# Patient Record
Sex: MALE | Race: WHITE
[De-identification: names, ages, dates, MRNs, and addresses within clinical notes are randomized per-mention and may not be internally consistent; named-entity substitution may affect disease eponyms.]

---

## 2023-05-10 ENCOUNTER — HOSPITAL ENCOUNTER (EMERGENCY)
Dept: HOSPITAL 97 - ER | Age: 28
LOS: 1 days | Discharge: HOME | End: 2023-05-11
Payer: COMMERCIAL

## 2023-05-10 DIAGNOSIS — S61.217A: Primary | ICD-10-CM

## 2023-05-10 PROCEDURE — 12001 RPR S/N/AX/GEN/TRNK 2.5CM/<: CPT

## 2023-05-10 PROCEDURE — 73140 X-RAY EXAM OF FINGER(S): CPT

## 2023-05-10 PROCEDURE — 99283 EMERGENCY DEPT VISIT LOW MDM: CPT

## 2023-05-10 NOTE — XMS REPORT
Continuity of Care Document

                             Created on:May 10, 2023



Patient:JOANNA AVERY

Sex:Male

:1995

External Reference #:[id number]





Demographics







                          Address                   [address] [place] [address]

 

                          Home Phone                [phone]

 

                          Work Phone                [phone]

 

                          Email Address             [email or web address]

[email or web address]

 

                          Preferred Language        English

 

                          Marital Status            Unknown

 

                          [employer] Affiliation     Unknown

 

                          Race                      Unknown

 

                          Ethnic Group              Unknown









Author







                          Organization              [facility]

t

 

                          Address                   [address] [place] [address]



                                                    [place] [address]

 

                          Phone                     [phone]









Support







                Name            Relationship    Address         Phone

 

                FIORDALIZA AVERY Unavailable     [address] [facility] [address] [facility] 

[phone]



                                                [place] [address] 

 

                FIORDALIZA AVERY Unavailable     [address] [facility] [address] [facility] 

[phone]



                                                [place] [address] 









Care Team Providers







                    Name                Role                Phone

 

                    Juan Elam  Attending Clinician Unavailable

 

                    Juan Elam A  Admitting Clinician Unavailable









Payers







           Payer Name Policy Type Policy Number Effective Date Expiration Date S

ource







Problems

This patient has no known problems.



Allergies, Adverse Reactions, Alerts

This patient has no known allergies or adverse reactions.



Medications

This patient has no known medications.



Procedures

This patient has no known procedures.



Encounters







        Start   End     Encounter Admission Attending Care    Care    Encounter 

Source



        Date/Time Date/Time Type    Type    Clinicians Facility Department ID   

   

 

        2021 Outpatient RICH Rosen   RADI    

[id number] [facility]



        09:11:00 09:11:00                 Juan                  06      [facility]







Results







           Test Description Test Time  Test Comments Results    Result     [facility]

e



                                                       Comments   

 

           - US SCROTUM AND 2021            *********************         

   



           CNTS       11:02:00              *********************            



                                            [facility]            



                                            : 1995 Sex:            



                                            M********************            



                                            *********************            



                                            *******************            



                                            Name:                 



                                            JOANNA AVERY :            



                                            1995 Age/S: 26            



                                            / M [id number]            



                                            [address] Unit #:            



                                            [id number] Loc:            



                                            [place] [address]            



                                            Phys: Juan Elam MD Acct:            



                                            [id number] Dis            



                                            Date: Status: REG CLI            



                                            PHONE #: [phone]            



                                            Exam Date: 2021            



                                            1000 FAX #: Reason:            



                                            N50.89 EXAMS:  CPT:            



                                            [id number] US SCROTUM            



                                            AND CNTS 84108            



                                            EXAMINATION: Scrotal            



                                            ultrasound            



                                            INDICATION: N50.89            



                                            COMPARISON: None            



                                            LOCATION: S17            



                                            TECHNIQUE: Grayscale            



                                            and color Doppler and            



                                            spectral Doppler            



                                            sonographic images            



                                            were obtained of the            



                                            scrotum. FINDINGS:            



                                            Scrotal skin is            



                                            normal in thickness.            



                                            Right testicle            



                                            measures 4.6 x 2.8 x            



                                            2.1 cm. Left testicle            



                                            measures 4.8 x 2.9 x            



                                            2.0 cm. Bilateral            



                                            testicles are            



                                            homogeneous in            



                                            echotexture without            



                                            evidence of focal            



                                            lesion. Relatively            



                                            symmetrical blood            



                                            flow is documented            



                                            within the bilateral            



                                            testicles. Right            



                                            epididymal head            



                                            measures 2.0 x 1.5 x            



                                            1.2 cm. Epididymal            



                                            head cyst measures            



                                            0.7 cm, which            



                                            corresponds to            



                                            palpable area of            



                                            concern indicated by            



                                            patient. Left            



                                            epididymal head            



                                            measures 1.6 x 1.1 x            



                                            1.0 cm. No focal            



                                            epididymal lesion            



                                            seen. Small bilateral            



                                            varicoceles. Small            



                                            bilateral hydroceles.            



                                            IMPRESSION:  1. Right            



                                            epididymal head cyst            



                                            corresponds to area            



                                            of palpable concern            



                                            indicated by patient.            



                                            2. Small bilateral            



                                            hydroceles. 3. Small            



                                            bilateral             



                                            varicoceles. PAGE 1            



                                            Signed Report            



                                            (CONTINUED) Name:            



                                            JOANNA AVERY :            



                                            1995 Age/S: 26            



                                            / M [id number]            



                                            [address] Unit #:            



                                            [id number] Loc:            



                                            [place] [address]            



                                            Phys: Juan Elam MD  Acct:            



                                            [id number] Dis            



                                            Date: Status: REG CLI            



                                            PHONE #: [phone]            



                                            Exam Date: 2021            



                                            1005 FAX #: Reason:            



                                            N50.89 EXAMS: CPT:            



                                            [id number] US SCROTUM            



                                            AND CNTS 58512            



                                            (Continued) **            



                                            Electronically Signed            



                                            by PATIENCE Hampton            



                                            ** ** on 2021            



                                            at 1102 ** Reported            



                                            and signed by: Yuniel Hampton M.D. CC:            



                                            Juan Elam MD            



                                            Technologist:            



                                            Chelo Beasley            



                                            Trnscb Date/Time:            



                                            2021 (1102)            



                                            ChristopherPE1 PAGE 2            



                                            Signed Report Name:            



                                            JOANNA AVERY [facility] :            



                                            1995 Age/S: 26            



                                            / M [address]            



                                            [address]  Unit #:            



                                            [id number] Loc:            



                                            [place] [address]            



                                            Phys: Juan Elam MD Acct:            



                                            [id number] Dis            



                                            Date: Status: REG CLI            



                                            PHONE #: [phone]            



                                            Exam Date: 2021            



                                            1005 FAX #: Reason:            



                                            N50.89  EXAMS: CPT:            



                                            [id number] US SCROTUM            



                                            AND CNTS 61424            



                                            (Continued) Orig            



                                            Print D/T: S:            



                                            2021 (1105)            



                                            Probe: PAGE 3 Signed            



                                            Report

## 2023-05-11 VITALS — OXYGEN SATURATION: 100 % | TEMPERATURE: 98.2 F

## 2023-05-11 VITALS — SYSTOLIC BLOOD PRESSURE: 116 MMHG | DIASTOLIC BLOOD PRESSURE: 79 MMHG

## 2023-05-11 PROCEDURE — 0HQGXZZ REPAIR LEFT HAND SKIN, EXTERNAL APPROACH: ICD-10-PCS

## 2023-05-11 NOTE — RAD REPORT
EXAM DESCRIPTION:   - Finger-Thumb Left - 5/10/2023 10:57 pm

 

 

CLINICAL HISTORY:  Laceration

 

TECHNIQUE:  2 views obtained of the left fifth digit

 

COMPARISON:  None available for comparison

 

FINDINGS:  Bones: No acute fracture or dislocation.

Joints: Joint spaces are unremarkable.

Soft tissues: No radiopaque foreign bodies. Soft tissue injury at the tip of the left fifth digit.

 

IMPRESSION:  1.   No acute fracture or dislocation.

2.   Soft tissue injury at the tip of the left fifth digit.

 

Electronically signed by:   Darin Casas MD   5/10/2023 11:15 PM CDT Workstation: [id number]

 

 

 

Due to temporary technical issues with the PACS/Fluency reporting system, reports are being signed by
 the in house radiologists without review as a courtesy to insure prompt reporting. The interpreting 
radiologist is fully responsible for the content of the report.

## 2023-05-11 NOTE — EDPHYS
Physician Documentation                                                                           

 [facility]                                                                 

Name: Julius Koehler                                                                            

Age: 27 yrs                                                                                       

Sex: Male                                                                                         

: 1995                                                                                   

MRN: [id number]                                                                                   

Arrival Date: 05/10/2023                                                                          

Time: 22:10                                                                                       

Account#: [id number]                                                                            

Bed 12                                                                                            

Private MD:                                                                                       

ED Physician Guicho Hammond                                                                      

HPI:                                                                                              

05/10                                                                                             

23:00 This 27 yrs old Male presents to ER via Ambulatory with complaints of Finger Injury.    cp  

23:00 The patient or guardian reports a laceration, clean. The complaints affect the distal   cp  

      phalanx left small finger.                                                                  

23:00 Context: The problem was sustained at home, resulted from cutting up sausage to cook.   cp  

      Onset: The symptoms/episode began/occurred just prior to arrival. Associated signs and      

      symptoms: The patient has no apparent associated signs or symptoms.                         

                                                                                                  

Historical:                                                                                       

- Allergies:                                                                                      

22:16 No Known Allergies;                                                                     mb9 

- Home Meds:                                                                                      

22:16 None [Active];                                                                          mb9 

- PMHx:                                                                                           

22:16 MRSA;                                                                                   mb9 

- PSHx:                                                                                           

22:18 Tonsillectomy;                                                                          mb9 

                                                                                                  

- Immunization history:: Adult Immunizations up to date.                                          

- Social history:: Smoking status: Patient denies any tobacco usage or history of.                

                                                                                                  

                                                                                                  

ROS:                                                                                              

23:05 Constitutional: Negative for body aches, chills, fever, poor PO intake.                 cp  

23:05 ENT: Negative for drainage from ear(s), ear pain, sore throat, difficulty swallowing,   cp  

      difficulty handling secretions.                                                             

23:05 Cardiovascular: Negative for chest pain.                                                    

23:05 Respiratory: Negative for cough, shortness of breath, wheezing.                             

23:05 Abdomen/GI: Negative for abdominal pain, nausea, vomiting, and diarrhea.                    

23:05 Skin: Positive for laceration(s), of the distal phalanx left small finger.                  

23:05 Neuro: Positive for numbness, of the distal phalanx left small finger, Negative for         

      altered mental status, headache.                                                            

23:05 All other systems are negative.                                                             

                                                                                                  

Exam:                                                                                             

23:10 Constitutional: The patient appears in no acute distress, alert, awake, comfortable,    cp  

      well developed, well nourished.                                                             

23:10 Head/Face:  Normocephalic, atraumatic.                                                  cp  

23:10 Chest/axilla: Inspection: normal.                                                           

23:10 Cardiovascular: Rate: normal.                                                               

23:10 Respiratory: the patient does not display signs of respiratory distress,  Respirations:     

      normal.                                                                                     

23:10 Abdomen/GI: Inspection: abdomen appears normal.                                             

23:10 Back: pain, is absent, ROM is normal.                                                       

23:10 Musculoskeletal/extremity: Extremities: grossly normal except: noted in the distal          

      phalanx left small finger: laceration, pain, tenderness, There is no evidence of            

      injury to nail, ROM: full active range of motion, in the left small finger, Perfusion:      

      the extremity is normally perfused throughout, the  distal phalanx left small finger        

      decreased sensation.                                                                        

                                                                                                  

Vital Signs:                                                                                      

22:14 Pulse 84; Resp 18; Temp 98.2; Pulse Ox 100% on R/A; Weight 77.11 kg; Height 5 ft. 5 in. mb9 

      ;                                                                                           

22:24  / 79;                                                                            mb9 

22:14 Body Mass Index 28.29 (77.11 kg, 165.1 cm)                                              mb9 

                                                                                                  

Laceration:                                                                                       

                                                                                             

00:30 Wound Repair of 2.5cm ( 1.0in ) subcutaneous laceration to disttal phalanx left small   cp  

      finger fish side. Linear shaped.. Distal neuro/vascular/tendon intact. Anesthesia:        

      Digital block administered with 5 mls of Lido/Marcaine. Wound prep: Moderate cleansing      

      by me, Wound irrigation by me. Skin closed with 4 5-0 Prolene using interrupted sutures     

      and sterile technique. Dressed with Bacitracin, non-adherent dressing. Patient              

      tolerated well.                                                                             

                                                                                                  

MDM:                                                                                              

05/10                                                                                             

22:22 Patient medically screened.                                                             cp  

22:45 Differential diagnosis: open fracture, simple laceration, nail injury.                    

                                                                                             

00:28 Data reviewed: vital signs, nurses notes, radiologic studies, plain films.              cp  

00:28 Independent interpretation of the following test(s) in the Emergency Department X-Ray:  cp  

      My interpretation is images of left small finger negative for fracture.                     

                                                                                                  

05/10                                                                                             

22:36 Order name: XRAY Finger-Thumb Left                                                      cp  

05/10                                                                                             

22:36 Order name: Dressing - Wound; Complete Time: 22:44                                      cp  

05/10                                                                                             

22:36 Order name: Gloves, Sterile; Complete Time: 22:44                                         

05/10                                                                                             

22:36 Order name: Setup Suture Tray; Complete Time: 22:44                                       

05/10                                                                                             

23:50 Order name: Wound Care: please clean and irrigate wound; Complete Time: 00:30           cp  

                                                                                             

00:28 Order name: Wound dressing; Complete Time: 00:30                                        cp  

                                                                                                  

Administered Medications:                                                                         

00:29 Drug: Bupivacaine Infiltration (0.5 %) 10 ml {Note: Administered by PARRIS Bermudez}    ll3 

      Volume: 10 ml; Route: Infiltration;                                                         

00:30 Follow up: Response: No adverse reaction                                                ll3 

00:30 Drug: Lidocaine Infiltration (1 %) 10 ml {Note: Administered by PARRIS Bermudez}        ll3 

      Volume: 5 ml; Route: Infiltration;                                                          

00:30 Follow up: Response: No adverse reaction                                                ll3 

                                                                                                  

                                                                                                  

Disposition Summary:                                                                              

23 00:29                                                                                    

Discharge Ordered                                                                                 

      Location: Home                                                                          cp  

      Problem: new                                                                            cp  

      Symptoms: have improved                                                                 cp  

      Condition: Stable                                                                       cp  

      Diagnosis                                                                                   

        - Laceration without foreign body of finger without damage to nail - left small fingercp  

      Followup:                                                                               cp  

        - With: Private Physician                                                                  

        - When: 7 - 10 days                                                                        

        - Reason: Staple/Suture removal                                                            

      Discharge Instructions:                                                                     

        - Discharge Summary Sheet                                                             cp  

        - Laceration Care, Adult                                                              cp  

      Forms:                                                                                      

        - Medication Reconciliation Form                                                      cp  

        - Thank You Letter                                                                    cp  

        - Antibiotic Education                                                                cp  

        - Prescription Opioid Use                                                             cp  

Signatures:                                                                                       

Dispatcher [employer]                           EDMS                                                 

Guicho Bailey PA PA   cp                                                   

Matthew George RN                      RN   ll3                                                  

Lizzie Paniagua RN                 RN   mb9                                                  

                                                                                                  

Corrections: (The following items were deleted from the chart)                                    

05/10                                                                                             

22:17 22:16 PMHx: None; chandrika                                                                   mb9 

                                                                                                  

**************************************************************************************************

## 2023-05-11 NOTE — ER
Nurse's Notes                                                                                     

 [facility]                                                                 

Name: Julius Koehler                                                                            

Age: 27 yrs                                                                                       

Sex: Male                                                                                         

: 1995                                                                                   

MRN: [id number]                                                                                   

Arrival Date: 05/10/2023                                                                          

Time: 22:10                                                                                       

Account#: [id number]                                                                            

Bed 12                                                                                            

Private MD:                                                                                       

Diagnosis: Laceration without foreign body of finger without damage to nail-left small finger     

                                                                                                  

Presentation:                                                                                     

05/10                                                                                             

22:14 Chief complaint: Patient states: "I cut my left pinky finger while chopping             mb9 

      vegetables.". Coronavirus screen: Vaccine status: Patient reports receiving the 2nd         

      dose of the covid vaccine. Ebola Screen: No symptoms or risks identified at this time.      

      Initial Sepsis Screen: Does the patient meet any 2 criteria? No. Patient's initial          

      sepsis screen is negative. Does the patient have a suspected source of infection? No.       

      Patient's initial sepsis screen is negative. Risk Assessment: Do you want to hurt           

      yourself or someone else? Patient reports no desire to harm self or others. Onset of        

      symptoms was May 10, 2023.                                                                  

22:14 Method Of Arrival: Ambulatory                                                           [facility] 

22:14 Acuity: AUTUMN 4                                                                           mb9 

                                                                                                  

Triage Assessment:                                                                                

22:17 General: Appears in no apparent distress. Behavior is calm, cooperative, appropriate    mb9 

      for age. Pain: Complains of pain in left pinky. Neuro: Level of Consciousness is awake,     

      alert, obeys commands, Oriented to person, place, time, situation, Appropriate for age.     

      Respiratory: Airway is patent Respiratory effort is even, unlabored, Respiratory            

      pattern is regular, symmetrical. Derm: Skin is pink, warm \T\ dry. Musculoskeletal: Range   

      of motion: intact in all extremities. Injury Description: Laceration sustained to left      

      pinky is clean, 0.5 to 2.5 cm long, not bleeding, was sustained 1-2 hours ago.              

                                                                                                  

Historical:                                                                                       

- Allergies:                                                                                      

22:16 No Known Allergies;                                                                     mb9 

- Home Meds:                                                                                      

22:16 None [Active];                                                                          mb9 

- PMHx:                                                                                           

22:16 MRSA;                                                                                   mb9 

- PSHx:                                                                                           

22:18 Tonsillectomy;                                                                          mb9 

                                                                                                  

- Immunization history:: Adult Immunizations up to date.                                          

- Social history:: Smoking status: Patient denies any tobacco usage or history of.                

                                                                                                  

                                                                                                  

Screenin:19 [facility] ED Fall Risk Assessment (Adult) History of falling in the last 3 months,       mb9 

      including since admission No falls in past 3 months (0 pts) Confusion or Disorientation     

      No (0 pts) Intoxicated or Sedated No (0 pts) Impaired Gait No (0 pts) Mobility Assist       

      Device Used No (0 pt) Altered Elimination No (0 pt) Score/Fall Risk Level 0 - 2 = Low       

      Risk Oriented to surroundings, Maintained a safe environment, Educated pt \T\ family on     

      fall prevention, incl call for assistance when getting out of bed. Abuse screen: Denies     

      threats or abuse. Nutritional screening: No deficits noted. Tuberculosis screening: No      

      symptoms or risk factors identified.                                                        

                                                                                                  

Assessment:                                                                                       

22:21 Reassessment: see triage assessment.                                                    mb9 

                                                                                             

00:19 Reassessment: No changes from previously documented assessment. Patient and/or family   mb9 

      updated on plan of care and expected duration. Pain level reassessed. Patient is alert,     

      oriented x 3, equal unlabored respirations, skin warm/dry/pink.                             

                                                                                                  

Vital Signs:                                                                                      

05/10                                                                                             

22:14 Pulse 84; Resp 18; Temp 98.2; Pulse Ox 100% on R/A; Weight 77.11 kg; Height 5 ft. 5 in. mb9 

      ;                                                                                           

22:24  / 79;                                                                            mb9 

22:14 Body Mass Index 28.29 (77.11 kg, 165.1 cm)                                              mb9 

                                                                                                  

ED Course:                                                                                        

22:12 Patient arrived in ED.                                                                  ja2 

22:14 Arm band placed on.                                                                     mb9 

22:16 Triage completed.                                                                       mb9 

22:19 Bed in low position. Call light in reach. Side rails up X 1. Client placed on           mb9 

      continuous cardiac and pulse oximetry monitoring. NIBP monitoring applied.                  

22:22 Guicho Bailey PA is PHCP.                                                                cp  

22:22 Guicho Hammond MD is Attending Physician.                                             cp  

22:59 XRAY Finger-Thumb Left In Process Unspecified.                                          EDMS

                                                                                             

00:19 Patient did not have IV access during this emergency room visit.                        mb9 

00:33 No provider procedures requiring assistance completed.                                  ll3 

                                                                                                  

Administered Medications:                                                                         

00:29 Drug: Bupivacaine Infiltration (0.5 %) 10 ml {Note: Administered by LORETA Bermudez.}    ll3 

      Volume: 10 ml; Route: Infiltration;                                                         

00:30 Follow up: Response: No adverse reaction                                                ll3 

00:30 Drug: Lidocaine Infiltration (1 %) 10 ml {Note: Administered by LORETA Bermudez.}        ll3 

      Volume: 5 ml; Route: Infiltration;                                                          

00:30 Follow up: Response: No adverse reaction                                                ll3 

                                                                                                  

                                                                                                  

Medication:                                                                                       

05/10                                                                                             

22:19 VIS not applicable for this client.                                                     mb9 

                                                                                                  

Outcome:                                                                                          

                                                                                             

00:29 Discharge ordered by MD.                                                                cp  

00:33 Discharged to home ambulatory, with significant other.                                  ll3 

00:33 Condition: stable                                                                           

00:33 Discharge instructions given to patient, significant other, Instructed on discharge         

      instructions, follow up and referral plans. Demonstrated understanding of instructions,     

      follow-up care.                                                                             

00:34 Patient left the ED.                                                                    3 

                                                                                                  

Signatures:                                                                                       

Dispatcher [employer]                           EDMS                                                 

Guicho Bailey PA PA cp Alexander, Jessica ja2 Loubet, Lynsea RN                      RN   ll3                                                  

Lizzie Paniagua RN                 RN   mb9                                                  

                                                                                                  

Corrections: (The following items were deleted from the chart)                                    

05/10                                                                                             

22:17 22:16 PMHx: None; mb9                                                                   mb9 

                                                                                                  

**************************************************************************************************